# Patient Record
Sex: MALE | Race: OTHER | URBAN - METROPOLITAN AREA
[De-identification: names, ages, dates, MRNs, and addresses within clinical notes are randomized per-mention and may not be internally consistent; named-entity substitution may affect disease eponyms.]

---

## 2024-01-22 ENCOUNTER — OFFICE VISIT (OUTPATIENT)
Dept: URGENT CARE | Facility: CLINIC | Age: 44
End: 2024-01-22
Payer: COMMERCIAL

## 2024-01-22 VITALS
TEMPERATURE: 97.5 F | OXYGEN SATURATION: 99 % | HEIGHT: 68 IN | HEART RATE: 90 BPM | SYSTOLIC BLOOD PRESSURE: 121 MMHG | BODY MASS INDEX: 19.37 KG/M2 | WEIGHT: 127.8 LBS | DIASTOLIC BLOOD PRESSURE: 81 MMHG

## 2024-01-22 DIAGNOSIS — J02.9 SORE THROAT: ICD-10-CM

## 2024-01-22 DIAGNOSIS — R22.1 MASS OF RIGHT SIDE OF NECK: Primary | ICD-10-CM

## 2024-01-22 DIAGNOSIS — H66.91 ACUTE OTITIS MEDIA, RIGHT: ICD-10-CM

## 2024-01-22 DIAGNOSIS — M54.31 SCIATICA OF RIGHT SIDE: ICD-10-CM

## 2024-01-22 PROBLEM — M25.531 RIGHT WRIST PAIN: Status: RESOLVED | Noted: 2024-01-22 | Resolved: 2024-01-22

## 2024-01-22 PROCEDURE — 99215 OFFICE O/P EST HI 40 MIN: CPT

## 2024-01-22 RX ORDER — PREDNISONE 10 MG/1
TABLET ORAL
Qty: 11 TABLET | Refills: 0 | Status: SHIPPED | OUTPATIENT
Start: 2024-01-22 | End: 2024-02-01

## 2024-01-22 RX ORDER — AMOXICILLIN AND CLAVULANATE POTASSIUM 875; 125 MG/1; MG/1
1 TABLET, FILM COATED ORAL 2 TIMES DAILY
Qty: 14 TABLET | Refills: 0 | Status: SHIPPED | OUTPATIENT
Start: 2024-01-22 | End: 2024-01-29

## 2024-01-22 RX ORDER — NAPROXEN 500 MG/1
TABLET ORAL EVERY 12 HOURS
COMMUNITY
Start: 2022-11-07

## 2024-01-22 ASSESSMENT — ENCOUNTER SYMPTOMS
LEG PAIN: 1
TROUBLE SWALLOWING: 0
NAUSEA: 0
ADENOPATHY: 1
GASTROINTESTINAL NEGATIVE: 1
MYALGIAS: 0
DIARRHEA: 0
ARTHRALGIAS: 0
DIZZINESS: 0
FACIAL SWELLING: 0
FEVER: 0
SINUS PAIN: 0
CONSTITUTIONAL NEGATIVE: 1
PHOTOPHOBIA: 0
SHORTNESS OF BREATH: 0
CARDIOVASCULAR NEGATIVE: 1
ABDOMINAL PAIN: 0
COUGH: 0
HEADACHES: 0
FATIGUE: 0
NECK PAIN: 1
PALPITATIONS: 0
VOMITING: 0
PARESIS: 0
RHINORRHEA: 0
TINGLING: 0
SORE THROAT: 1
NEUROLOGICAL NEGATIVE: 1
NUMBNESS: 0
RESPIRATORY NEGATIVE: 1
WEAKNESS: 0
CHILLS: 0
DIAPHORESIS: 0
SINUS PRESSURE: 0

## 2024-01-22 NOTE — PROGRESS NOTES
"Subjective     Rum Yuliya Salvador is a 43 y.o. male who presents for Neck Pain.    Patient presents with firm bump on the right side of his neck that is increasing in size over the last 2 weeks. He reports trying tylenol with minimal relief. He reports also having occasional pains in his right hip that sometimes go down to his foot and cause intermittent numbness/tingling for the last several months.      History provided by:  Patient, relative and significant other (son)   used: Yes    Neck Pain  Pain location:  R side  Quality:  Aching and stiffness  Stiffness is present: with movement to the right.  Pain radiates to:  R shoulder  Pain is:  Same all the time  Chronicity:  New  Context: not fall, not lifting a heavy object, not MVC and not recent injury    Relieved by:  Position  Worsened by:  Twisting and swallowing  Associated symptoms: leg pain (right)    Associated symptoms: no chest pain, no fever, no headaches, no numbness, no paresis, no photophobia, no tingling and no weakness    Risk factors: no hx of head and neck radiation, no recent head injury and no recurrent falls        /81 (BP Location: Right arm, Patient Position: Sitting, BP Cuff Size: Small adult)   Pulse 90   Temp 36.4 °C (97.5 °F) (Oral)   Ht 1.727 m (5' 8\")   Wt 58 kg (127 lb 12.8 oz)   SpO2 99%   BMI 19.43 kg/m²    All vitals have been reviewed and are stable.    Review of Systems   Constitutional: Negative.  Negative for chills, diaphoresis, fatigue and fever.   HENT:  Positive for sore throat. Negative for congestion, dental problem, ear discharge, ear pain, facial swelling, mouth sores, postnasal drip, rhinorrhea, sinus pressure, sinus pain and trouble swallowing.    Eyes:  Negative for photophobia.   Respiratory: Negative.  Negative for cough and shortness of breath.    Cardiovascular: Negative.  Negative for chest pain and palpitations.   Gastrointestinal: Negative.  Negative for abdominal pain, diarrhea, nausea " and vomiting.   Musculoskeletal:  Positive for neck pain. Negative for arthralgias, gait problem, myalgias and neck stiffness.   Skin: Negative.  Negative for rash.   Neurological: Negative.  Negative for dizziness, tingling, weakness, numbness and headaches.   Hematological:  Positive for adenopathy.       Objective   Physical Exam  Vitals and nursing note reviewed.   Constitutional:       General: He is awake. He is not in acute distress.     Appearance: Normal appearance. He is well-developed and well-groomed. He is not ill-appearing, toxic-appearing or diaphoretic.   HENT:      Head: Normocephalic and atraumatic.      Jaw: There is normal jaw occlusion. No pain on movement.      Salivary Glands: Right salivary gland is not tender. Left salivary gland is not tender.      Right Ear: Ear canal and external ear normal. No tenderness. A middle ear effusion is present. Tympanic membrane is injected and erythematous. Tympanic membrane is not perforated.      Left Ear: Tympanic membrane, ear canal and external ear normal.  No middle ear effusion. Tympanic membrane is not injected.      Nose: Nose normal. No congestion or rhinorrhea.      Mouth/Throat:      Lips: Pink.      Mouth: Mucous membranes are moist.      Pharynx: Oropharynx is clear. Uvula midline. Posterior oropharyngeal erythema present. No oropharyngeal exudate.      Tonsils: No tonsillar exudate. 1+ on the right. 1+ on the left.   Eyes:      Extraocular Movements: Extraocular movements intact.      Conjunctiva/sclera: Conjunctivae normal.      Pupils: Pupils are equal, round, and reactive to light.   Cardiovascular:      Rate and Rhythm: Normal rate and regular rhythm.   Pulmonary:      Effort: Pulmonary effort is normal. No respiratory distress.   Abdominal:      General: Abdomen is flat. There is no distension.   Musculoskeletal:         General: No swelling or deformity. Normal range of motion.      Cervical back: Normal range of motion.      Thoracic  back: Normal.      Lumbar back: Normal. No spasms, tenderness or bony tenderness. Normal range of motion. Negative right straight leg raise test and negative left straight leg raise test.      Right hip: Normal. No tenderness. Normal range of motion. Normal strength.      Left hip: Normal. No tenderness. Normal range of motion. Normal strength.   Lymphadenopathy:      Head:      Right side of head: Submandibular and tonsillar adenopathy present.      Left side of head: Submandibular adenopathy present. No tonsillar adenopathy.      Cervical: Cervical adenopathy present.      Right cervical: Superficial cervical adenopathy present. No posterior cervical adenopathy.     Left cervical: No superficial or posterior cervical adenopathy.      Upper Body:      Right upper body: No supraclavicular adenopathy.      Left upper body: No supraclavicular adenopathy.      Comments: ~3cm mildly tender mobile mass of right side of neck, suspected lymph node   Skin:     General: Skin is warm and dry.   Neurological:      General: No focal deficit present.      Mental Status: He is alert and oriented to person, place, and time. Mental status is at baseline.      Motor: Motor function is intact.      Coordination: Coordination is intact.   Psychiatric:         Mood and Affect: Mood and affect normal.         Speech: Speech normal.         Behavior: Behavior normal.         Thought Content: Thought content normal.         Judgment: Judgment normal.         Assessment/Plan   Problem List Items Addressed This Visit       Sciatica of right side    Relevant Medications    predniSONE (Deltasone) 10 mg tablet     Other Visit Diagnoses       Mass of right side of neck    -  Primary    Relevant Medications    amoxicillin-pot clavulanate (Augmentin) 875-125 mg tablet    predniSONE (Deltasone) 10 mg tablet    Other Relevant Orders    US head neck soft tissue    Acute otitis media, right        Relevant Medications    amoxicillin-pot clavulanate  (Augmentin) 875-125 mg tablet    predniSONE (Deltasone) 10 mg tablet    Sore throat        Relevant Medications    amoxicillin-pot clavulanate (Augmentin) 875-125 mg tablet    predniSONE (Deltasone) 10 mg tablet            Red flags for reporting to ER have been reviewed with the patient.    Current diagnosis, any medication changes, and all in-office lab or radiologic results have been reviewed with the patient at the time of the visit.   If symptoms do not improve or worsen, patient is to follow up with PCP or report to the emergency room.   Patient is alert and oriented x3 and non-toxic appearing. Vital signs are stable.   Patient and/or guardian has sufficient decision-making capabilities at this time and reports understanding and agreement with the treatment plan made through shared decision-making.

## 2024-01-22 NOTE — PATIENT INSTRUCTIONS
NECK MASS      - AMOXICILLIN - CLAVULANATE ACID (Augmentin) 7 DAYS (antibiotic) has been prescribed for the treatment of infection behind the ear drum   - PREDNISONE (oral steroid) has been prescribed to decrease inflammation and pain around the ears and eustachian tubes to allow proper drainage of fluid and possibly reduce neck swelling   - US soft tissue neck was ordered for further evaluation of mass     - Ibuprofen and/or Acetaminophen may be used every 4-6 hours for pain, inflammation, and fever reduction as needed   - Hydrogen Peroxide or OTC Debrox drops may be used regularly to loosen cerumen (ear wax) to promote self cleaning    - Rubbing Alcohol and/or Distilled White Vinegar (Acetic Acid) may be used in ears after swimming to dry out water and prevent infection of the ear canal     - It is common to feel that ears still feel clogged a few days after completing treatment as fluid may remain behind the ear drum after the infection is cleared.  If symptoms do not improve or get worse in 3-4 days follow-up with PCP as additional treatment may be required.     Steroids are strong medications that mimic the body's natural production of cortisol and is prescribed to reduce inflammation especially when pain is caused by inflammation. Short-term side effects of steroids reviewed, including gastritis, insomnia, moodiness, acne, fluid retention (leg swelling) and potential for increase in blood pressure or sugar. If you have have a history of hypertension or diabetes, you should monitor at home regularly while on this medication and discontinue if levels are high (BP>160/90 or glucose >200). Steroids are often prescribed to be tapered off into lower doses after prolonged use to avoid withdrawal symptoms such as fatigue, weakness, body aches, nausea, and lightheadedness.  However, if a severe reaction is noted (including rash or difficulty breathing) discontinue and seek emergency care immediately.

## 2024-01-28 ASSESSMENT — ENCOUNTER SYMPTOMS: NECK STIFFNESS: 0
